# Patient Record
Sex: FEMALE | URBAN - METROPOLITAN AREA
[De-identification: names, ages, dates, MRNs, and addresses within clinical notes are randomized per-mention and may not be internally consistent; named-entity substitution may affect disease eponyms.]

---

## 2021-07-09 ENCOUNTER — APPOINTMENT (OUTPATIENT)
Dept: CT IMAGING | Age: 71
End: 2021-07-09

## 2021-07-09 ENCOUNTER — HOSPITAL ENCOUNTER (EMERGENCY)
Age: 71
Discharge: LEFT BEFORE DISCHARGE | End: 2021-07-09
Attending: EMERGENCY MEDICINE

## 2021-07-09 VITALS
WEIGHT: 189 LBS | OXYGEN SATURATION: 99 % | SYSTOLIC BLOOD PRESSURE: 170 MMHG | DIASTOLIC BLOOD PRESSURE: 77 MMHG | HEIGHT: 63 IN | TEMPERATURE: 97.8 F | BODY MASS INDEX: 33.49 KG/M2 | HEART RATE: 100 BPM | RESPIRATION RATE: 16 BRPM

## 2021-07-09 NOTE — ED NOTES
FIRST PROVIDER CONTACT ASSESSMENT NOTE        Department of Emergency Medicine            ED  First Provider Note            7/9/21  7:05 PM EDT    Chief Complaint: Abnormal Lab (chemo this am, blood work done at that time, hgb 7.5)      History of Present Illness:    Josh Calderon is a 73 y.o. female who presents to the emergency department for complaints of abnormal lab and states her hemoglobin was 7.5 and she had 2 small episodes of vertigo while at work today and works at the [employer] [place] and states she called her oncologist and was told that it may be a symptom of low hemoglobin and to get to the ER for transfusion. She reports that she had a history in the past of ovarian cancer had a bilateral mastectomy and has the BRCA2 gene. She denies any fever, chills, chest pain, shortness of breath, tarry or bloody stools or any hematemesis. Focused Screening Exam:  Constitutional:  Alert, appears stated age and is in no distress.     *ALLERGIES*     Other     ED Triage Vitals   BP Temp Temp Source Pulse Resp SpO2 Height Weight   07/09/21 1859 07/09/21 1859 07/09/21 1859 07/09/21 1707 07/09/21 1707 07/09/21 1707 07/09/21 1859 07/09/21 1859   (!) 170/77 97.8 °F (36.6 °C) Temporal 110 16 96 % 5' 3\" (1.6 m) 189 lb (85.7 kg)        Initial Plan of Care:  Initiate Treatment-Testing, Proceed toTreatment Area When Bed Available for ED Attending/MLP to Continue Care    -----------------[address] ASSESSMENT NOTE--------------  Electronically signed by LINDSAY Marie CNP   DD: 7/9/21     LINDSAY Marie CNP  07/10/21 0030

## 2024-09-03 ENCOUNTER — OUTSIDE SERVICES (OUTPATIENT)
Dept: FAMILY MEDICINE CLINIC | Age: 74
End: 2024-09-03

## 2024-09-03 DIAGNOSIS — S82.232S CLOSED DISPLACED OBLIQUE FRACTURE OF SHAFT OF LEFT TIBIA, SEQUELA: ICD-10-CM
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
## 2024-09-03 DIAGNOSIS — K59.03 DRUG-INDUCED CONSTIPATION: Primary | ICD-10-CM

## 2024-09-03 DIAGNOSIS — C50.911 MALIGNANT NEOPLASM OF RIGHT FEMALE BREAST, UNSPECIFIED ESTROGEN RECEPTOR STATUS, UNSPECIFIED SITE OF BREAST (HCC): ICD-10-CM

## 2024-09-03 DIAGNOSIS — C56.9 MALIGNANT NEOPLASM OF OVARY, UNSPECIFIED LATERALITY (HCC): ICD-10-CM

## 2024-09-03 NOTE — PROGRESS NOTES
9/3/2024    Beth Siegler  1950    This resident is being seen today for a skilled evaluation visit.  She is a resident who has long-term medical conditions including ovarian cancer with immunotherapy, breast cancer status post bilateral mastectomy, aortic dissection repair, fall, head injury with history of head trauma.  She is a 74 y.o. female resident who is being seen today for skilled services with which this resident has been under assessment for mildly displaced oblique fracture to the distal fibula as noted on recent imaging studies.  This is secondary to a fall in her home setting where she apparently lost her balance and fell onto her side which resulted in pain and inability to bear weight and she therefore presented to the hospital setting.  She is currently having nonweightbearing status and states she is scheduled to be seen in conjunction with orthopedics tomorrow.  She has no complaints regarding any pain at this point in time but does state that she is nonweightbearing.  She does however admit that she has had constipation despite her current treatment which is also included a one-time dose of milk of magnesia and MiraLAX.  She has no headaches or dizziness, sore throat, chest pain, nausea or vomiting, dysuria or frequency, fever or chills, falls or syncopal events.  She does however admit that she has some ongoing neuropathy like symptoms but she refuses to take Neurontin to help manage her symptomatology.          Medications:  Aspirin 81 mg daily  Atorvastatin 40 mg at bedtime  Hydrocodone/acetaminophen 7.5-325 mg every 8 hours as needed  Heparin lock flush  Magnesium 250 mg daily  Senna 2 tablets twice daily   omega-3 1000 mg twice daily  MiraLAX 17 g daily as needed        Objective     Vital Signs: /76 pulse 76 respirations 18 temperature 97.9 O2 98% weight 149.4 pounds        Physical examination:Skin is essentially warm and dry. HEENT unremarkable. Neck is supple. Heart regular rate